# Patient Record
Sex: FEMALE | Race: WHITE | NOT HISPANIC OR LATINO | Employment: FULL TIME | URBAN - METROPOLITAN AREA
[De-identification: names, ages, dates, MRNs, and addresses within clinical notes are randomized per-mention and may not be internally consistent; named-entity substitution may affect disease eponyms.]

---

## 2017-01-25 ENCOUNTER — RECORDS - HEALTHEAST (OUTPATIENT)
Dept: LAB | Facility: CLINIC | Age: 62
End: 2017-01-25

## 2017-01-25 LAB
AST SERPL W P-5'-P-CCNC: 22 U/L (ref 0–40)
CHOLEST SERPL-MCNC: 218 MG/DL
FASTING STATUS PATIENT QL REPORTED: ABNORMAL
HDLC SERPL-MCNC: 45 MG/DL
LDLC SERPL CALC-MCNC: 124 MG/DL
TRIGL SERPL-MCNC: 243 MG/DL

## 2017-11-28 ENCOUNTER — RECORDS - HEALTHEAST (OUTPATIENT)
Dept: LAB | Facility: CLINIC | Age: 62
End: 2017-11-28

## 2017-11-28 LAB
CHOLEST SERPL-MCNC: 225 MG/DL
FASTING STATUS PATIENT QL REPORTED: YES
HDLC SERPL-MCNC: 48 MG/DL
LDLC SERPL CALC-MCNC: 131 MG/DL
TRIGL SERPL-MCNC: 230 MG/DL

## 2018-03-07 ENCOUNTER — RECORDS - HEALTHEAST (OUTPATIENT)
Dept: LAB | Facility: CLINIC | Age: 63
End: 2018-03-07

## 2018-03-07 LAB
ANION GAP SERPL CALCULATED.3IONS-SCNC: 12 MMOL/L (ref 5–18)
BUN SERPL-MCNC: 15 MG/DL (ref 8–22)
CALCIUM SERPL-MCNC: 9.5 MG/DL (ref 8.5–10.5)
CHLORIDE BLD-SCNC: 106 MMOL/L (ref 98–107)
CHOLEST SERPL-MCNC: 254 MG/DL
CO2 SERPL-SCNC: 23 MMOL/L (ref 22–31)
CREAT SERPL-MCNC: 0.78 MG/DL (ref 0.6–1.1)
FASTING STATUS PATIENT QL REPORTED: ABNORMAL
GFR SERPL CREATININE-BSD FRML MDRD: >60 ML/MIN/1.73M2
GLUCOSE BLD-MCNC: 118 MG/DL (ref 70–125)
HDLC SERPL-MCNC: 58 MG/DL
LDLC SERPL CALC-MCNC: 158 MG/DL
POTASSIUM BLD-SCNC: 4.2 MMOL/L (ref 3.5–5)
SODIUM SERPL-SCNC: 141 MMOL/L (ref 136–145)
TRIGL SERPL-MCNC: 191 MG/DL

## 2018-10-10 ENCOUNTER — RECORDS - HEALTHEAST (OUTPATIENT)
Dept: LAB | Facility: CLINIC | Age: 63
End: 2018-10-10

## 2018-10-10 LAB
ANION GAP SERPL CALCULATED.3IONS-SCNC: 11 MMOL/L (ref 5–18)
BUN SERPL-MCNC: 19 MG/DL (ref 8–22)
CALCIUM SERPL-MCNC: 9.6 MG/DL (ref 8.5–10.5)
CHLORIDE BLD-SCNC: 103 MMOL/L (ref 98–107)
CHOLEST SERPL-MCNC: 225 MG/DL
CO2 SERPL-SCNC: 24 MMOL/L (ref 22–31)
CREAT SERPL-MCNC: 0.79 MG/DL (ref 0.6–1.1)
FASTING STATUS PATIENT QL REPORTED: ABNORMAL
GFR SERPL CREATININE-BSD FRML MDRD: >60 ML/MIN/1.73M2
GLUCOSE BLD-MCNC: 85 MG/DL (ref 70–125)
HDLC SERPL-MCNC: 51 MG/DL
LDLC SERPL CALC-MCNC: 109 MG/DL
MAGNESIUM SERPL-MCNC: 1.9 MG/DL (ref 1.8–2.6)
POTASSIUM BLD-SCNC: 4.6 MMOL/L (ref 3.5–5)
SODIUM SERPL-SCNC: 138 MMOL/L (ref 136–145)
TRIGL SERPL-MCNC: 327 MG/DL

## 2019-01-16 ENCOUNTER — RECORDS - HEALTHEAST (OUTPATIENT)
Dept: LAB | Facility: CLINIC | Age: 64
End: 2019-01-16

## 2019-01-16 LAB
ANION GAP SERPL CALCULATED.3IONS-SCNC: 12 MMOL/L (ref 5–18)
BUN SERPL-MCNC: 13 MG/DL (ref 8–22)
CALCIUM SERPL-MCNC: 9.2 MG/DL (ref 8.5–10.5)
CHLORIDE BLD-SCNC: 104 MMOL/L (ref 98–107)
CHOLEST SERPL-MCNC: 214 MG/DL
CO2 SERPL-SCNC: 25 MMOL/L (ref 22–31)
CREAT SERPL-MCNC: 0.76 MG/DL (ref 0.6–1.1)
FASTING STATUS PATIENT QL REPORTED: ABNORMAL
GFR SERPL CREATININE-BSD FRML MDRD: >60 ML/MIN/1.73M2
GLUCOSE BLD-MCNC: 97 MG/DL (ref 70–125)
HDLC SERPL-MCNC: 54 MG/DL
LDLC SERPL CALC-MCNC: 101 MG/DL
POTASSIUM BLD-SCNC: 3.9 MMOL/L (ref 3.5–5)
SODIUM SERPL-SCNC: 141 MMOL/L (ref 136–145)
TRIGL SERPL-MCNC: 297 MG/DL

## 2020-02-18 ENCOUNTER — RECORDS - HEALTHEAST (OUTPATIENT)
Dept: LAB | Facility: CLINIC | Age: 65
End: 2020-02-18

## 2020-02-18 LAB
ANION GAP SERPL CALCULATED.3IONS-SCNC: 10 MMOL/L (ref 5–18)
BUN SERPL-MCNC: 9 MG/DL (ref 8–22)
CALCIUM SERPL-MCNC: 9.1 MG/DL (ref 8.5–10.5)
CHLORIDE BLD-SCNC: 105 MMOL/L (ref 98–107)
CHOLEST SERPL-MCNC: 201 MG/DL
CO2 SERPL-SCNC: 27 MMOL/L (ref 22–31)
CREAT SERPL-MCNC: 0.79 MG/DL (ref 0.6–1.1)
FASTING STATUS PATIENT QL REPORTED: YES
GFR SERPL CREATININE-BSD FRML MDRD: >60 ML/MIN/1.73M2
GLUCOSE BLD-MCNC: 109 MG/DL (ref 70–125)
HDLC SERPL-MCNC: 48 MG/DL
LDLC SERPL CALC-MCNC: 109 MG/DL
POTASSIUM BLD-SCNC: 4.5 MMOL/L (ref 3.5–5)
SODIUM SERPL-SCNC: 142 MMOL/L (ref 136–145)
TRIGL SERPL-MCNC: 222 MG/DL

## 2020-02-26 ASSESSMENT — MIFFLIN-ST. JEOR: SCORE: 1900.79

## 2020-02-27 ASSESSMENT — MIFFLIN-ST. JEOR: SCORE: 1871.76

## 2020-03-03 ENCOUNTER — ANESTHESIA - HEALTHEAST (OUTPATIENT)
Dept: SURGERY | Facility: CLINIC | Age: 65
End: 2020-03-03

## 2020-03-03 ENCOUNTER — SURGERY - HEALTHEAST (OUTPATIENT)
Dept: SURGERY | Facility: CLINIC | Age: 65
End: 2020-03-03

## 2020-03-03 ASSESSMENT — MIFFLIN-ST. JEOR
SCORE: 1832.75
SCORE: 1832.75

## 2020-03-05 ENCOUNTER — HOME CARE/HOSPICE - HEALTHEAST (OUTPATIENT)
Dept: HOME HEALTH SERVICES | Facility: HOME HEALTH | Age: 65
End: 2020-03-05

## 2020-03-06 ENCOUNTER — DOCUMENTATION ONLY (OUTPATIENT)
Dept: OTHER | Facility: CLINIC | Age: 65
End: 2020-03-06

## 2020-03-06 ENCOUNTER — AMBULATORY - HEALTHEAST (OUTPATIENT)
Dept: OTHER | Facility: CLINIC | Age: 65
End: 2020-03-06

## 2020-03-07 ENCOUNTER — HOME CARE/HOSPICE - HEALTHEAST (OUTPATIENT)
Dept: HOME HEALTH SERVICES | Facility: HOME HEALTH | Age: 65
End: 2020-03-07

## 2020-03-08 ENCOUNTER — HOME CARE/HOSPICE - HEALTHEAST (OUTPATIENT)
Dept: HOME HEALTH SERVICES | Facility: HOME HEALTH | Age: 65
End: 2020-03-08

## 2020-03-10 ENCOUNTER — HOME CARE/HOSPICE - HEALTHEAST (OUTPATIENT)
Dept: HOME HEALTH SERVICES | Facility: HOME HEALTH | Age: 65
End: 2020-03-10

## 2020-03-13 ENCOUNTER — HOME CARE/HOSPICE - HEALTHEAST (OUTPATIENT)
Dept: HOME HEALTH SERVICES | Facility: HOME HEALTH | Age: 65
End: 2020-03-13

## 2020-03-16 ENCOUNTER — COMMUNICATION - HEALTHEAST (OUTPATIENT)
Dept: SLEEP MEDICINE | Facility: CLINIC | Age: 65
End: 2020-03-16

## 2020-03-17 ENCOUNTER — HOME CARE/HOSPICE - HEALTHEAST (OUTPATIENT)
Dept: HOME HEALTH SERVICES | Facility: HOME HEALTH | Age: 65
End: 2020-03-17

## 2020-05-19 ENCOUNTER — AMBULATORY - HEALTHEAST (OUTPATIENT)
Dept: VASCULAR SURGERY | Facility: CLINIC | Age: 65
End: 2020-05-19

## 2020-05-19 DIAGNOSIS — M79.604 BILATERAL LEG PAIN: ICD-10-CM

## 2020-05-19 DIAGNOSIS — M79.605 BILATERAL LEG PAIN: ICD-10-CM

## 2020-05-19 DIAGNOSIS — I83.90 VARICOSE VEIN OF LEG: ICD-10-CM

## 2021-04-26 ENCOUNTER — RECORDS - HEALTHEAST (OUTPATIENT)
Dept: LAB | Facility: CLINIC | Age: 66
End: 2021-04-26

## 2021-04-26 LAB
ALBUMIN SERPL-MCNC: 3.6 G/DL (ref 3.5–5)
ALP SERPL-CCNC: 124 U/L (ref 45–120)
ALT SERPL W P-5'-P-CCNC: 23 U/L (ref 0–45)
ANION GAP SERPL CALCULATED.3IONS-SCNC: 13 MMOL/L (ref 5–18)
AST SERPL W P-5'-P-CCNC: 20 U/L (ref 0–40)
BILIRUB SERPL-MCNC: 0.5 MG/DL (ref 0–1)
BUN SERPL-MCNC: 15 MG/DL (ref 8–22)
CALCIUM SERPL-MCNC: 8.7 MG/DL (ref 8.5–10.5)
CHLORIDE BLD-SCNC: 104 MMOL/L (ref 98–107)
CHOLEST SERPL-MCNC: 199 MG/DL
CO2 SERPL-SCNC: 24 MMOL/L (ref 22–31)
CREAT SERPL-MCNC: 0.81 MG/DL (ref 0.6–1.1)
FASTING STATUS PATIENT QL REPORTED: ABNORMAL
GFR SERPL CREATININE-BSD FRML MDRD: >60 ML/MIN/1.73M2
GLUCOSE BLD-MCNC: 151 MG/DL (ref 70–125)
HDLC SERPL-MCNC: 43 MG/DL
LDLC SERPL CALC-MCNC: 115 MG/DL
POTASSIUM BLD-SCNC: 4.3 MMOL/L (ref 3.5–5)
PROT SERPL-MCNC: 6.3 G/DL (ref 6–8)
SODIUM SERPL-SCNC: 141 MMOL/L (ref 136–145)
TRIGL SERPL-MCNC: 206 MG/DL
VIT B12 SERPL-MCNC: 211 PG/ML (ref 213–816)

## 2021-05-26 ENCOUNTER — RECORDS - HEALTHEAST (OUTPATIENT)
Dept: LAB | Facility: CLINIC | Age: 66
End: 2021-05-26

## 2021-05-26 VITALS
HEART RATE: 73 BPM | OXYGEN SATURATION: 97 % | DIASTOLIC BLOOD PRESSURE: 110 MMHG | TEMPERATURE: 98.5 F | SYSTOLIC BLOOD PRESSURE: 168 MMHG

## 2021-05-26 LAB — HBA1C MFR BLD: 6.8 %

## 2021-05-27 VITALS
HEART RATE: 76 BPM | TEMPERATURE: 98.2 F | RESPIRATION RATE: 18 BRPM | OXYGEN SATURATION: 98 % | SYSTOLIC BLOOD PRESSURE: 122 MMHG | DIASTOLIC BLOOD PRESSURE: 76 MMHG

## 2021-05-30 ENCOUNTER — RECORDS - HEALTHEAST (OUTPATIENT)
Dept: ADMINISTRATIVE | Facility: CLINIC | Age: 66
End: 2021-05-30

## 2021-05-31 ENCOUNTER — RECORDS - HEALTHEAST (OUTPATIENT)
Dept: ADMINISTRATIVE | Facility: CLINIC | Age: 66
End: 2021-05-31

## 2021-06-04 VITALS — BODY MASS INDEX: 48.65 KG/M2 | HEIGHT: 64 IN | WEIGHT: 285 LBS

## 2021-06-06 NOTE — ANESTHESIA PROCEDURE NOTES
Spinal Block    Patient location during procedure: OR  Start time: 3/3/2020 2:18 PM  End time: 3/3/2020 2:42 PM  Reason for block: primary anesthetic    Staffing:  Performing  Anesthesiologist: Nish Khoury MD    Preanesthetic Checklist  Completed: patient identified, risks, benefits, and alternatives discussed, timeout performed, consent obtained, airway assessed, oxygen available, suction available, emergency drugs available and hand hygiene performed  Spinal Block  Patient position: sitting  Prep: ChloraPrep  Patient monitoring: heart rate, cardiac monitor, continuous pulse ox and blood pressure  Approach: midline  Location: L3-4  Injection technique: single-shot  Needle type: pencil-tip   Needle gauge: 22 G    Assessment  Events: failed spinal    Additional Notes:  Multiple failed attempts at multiple levels by both Sarbjit Khoury and Kae.   Proceed to GETA.

## 2021-06-06 NOTE — ANESTHESIA PROCEDURE NOTES
Peripheral Block    Patient location during procedure: pre-op  Start time: 3/3/2020 1:54 PM  End time: 3/3/2020 1:59 PM  post-op analgesia per surgeon order as noted in medical record  Staffing:  Performing  Anesthesiologist: Nish Khoury MD  Preanesthetic Checklist  Completed: patient identified, site marked, risks, benefits, and alternatives discussed, timeout performed, consent obtained, airway assessed, oxygen available, suction available, emergency drugs available and hand hygiene performed  Peripheral Block  Block type: saphenous, adductor canal block  Prep: ChloraPrep  Patient position: supine  Patient monitoring: blood pressure, heart rate, continuous pulse oximetry and cardiac monitor  Laterality: right  Injection technique: ultrasound guided    Ultrasound used to visualize needle placement in proximity to nerve being blocked: yes   US used to visualize anesthetic spread  Visualized anatomic structures normal  No Pathological Findings  Permanent ultrasound image captured for medical record  Sterile gel and probe cover used for ultrasound.  Needle  Needle type: Stimuplex   Needle gauge: 20G  Needle length: 4 in  no peripheral nerve catheter placed  Assessment  Injection assessment: negative aspiration for heme, no paresthesia on injection, incremental injection and no difficulty with injection  Additional Notes  15ml 0.5% bupivacaine w/ epi 1:200K injected for nerve block.

## 2021-06-06 NOTE — ANESTHESIA PREPROCEDURE EVALUATION
Anesthesia Evaluation      Patient summary reviewed   No history of anesthetic complications     Airway   Mallampati: II  Neck ROM: full   Pulmonary - negative ROS and normal exam   (+) asthma  moderate,                          Cardiovascular - negative ROS and normal exam  (+) hypertension well controlled, , hypercholesterolemia,      Neuro/Psych - negative ROS   (+) depression, anxiety/panic attacks,     Endo/Other - negative ROS   (+) arthritis, obesity (BMI 49),      GI/Hepatic/Renal - negative ROS   (+) GERD well controlled,             Dental - normal exam                        Anesthesia Plan  Planned anesthetic: spinal and peripheral nerve block  Peripheral nerve block for post-op analgesia as ordered by surgeon.  Saphenous nerve block.  ASA 3     Anesthetic plan and risks discussed with: patient    Post-op plan: routine recovery

## 2021-06-06 NOTE — ANESTHESIA POSTPROCEDURE EVALUATION
Patient: Shelia Yang  Procedure(s):  RIGHT TOTAL KNEE ARTHROPLASTY (Right)  Anesthesia type: spinal    Patient location: PACU  Last vitals:   Vitals Value Taken Time   /65 3/3/2020  5:10 PM   Temp 36.7  C (98.1  F) 3/3/2020  4:20 PM   Pulse 86 3/3/2020  5:20 PM   Resp 24 3/3/2020  5:17 PM   SpO2 94 % 3/3/2020  5:20 PM   Vitals shown include unvalidated device data.  Post vital signs: stable  Level of consciousness: responds to simple questions and responds to stimulation, resting comfortably  Post-anesthesia pain: pain controlled, 3/10  Post-anesthesia nausea and vomiting: no  Pulmonary: unassisted, spontaneous ventilation, face mask  Cardiovascular: stable and blood pressure at baseline  Hydration: adequate  Anesthetic events: no    QCDR Measures:  ASA# 11 - [place]-op Cardiac Arrest: ASA11B - Patient did NOT experience unanticipated cardiac arrest  ASA# 12 - [place]-op Mortality Rate: ASA12B - Patient did NOT die  ASA# 13 - PACU Re-Intubation Rate: ASA13B - Patient did NOT require a new airway mgmt  ASA# 10 - Composite Anes Safety: ASA10A - No serious adverse event    Additional Notes: unable to do SAB, changed to general; pain 3/10 with IV medications, no nausea; supplemental oxygen via mask at 3-4 L; will place ABEBA orders given risk factors for ABEBA

## 2021-06-06 NOTE — ANESTHESIA CARE TRANSFER NOTE
Last vitals:   Vitals:    03/03/20 1602   BP: 165/79   Pulse: 87   Resp: 16   Temp: 37.1  C (98.8  F)   SpO2: 95%     Patient's level of consciousness is drowsy  Spontaneous respirations: yes  Maintains airway independently: yes  Dentition unchanged: yes  Oropharynx: oropharynx clear of all foreign objects    QCDR Measures:  ASA# 20 - Surgical Safety Checklist: WHO surgical safety checklist completed prior to induction    PQRS# 430 - Adult PONV Prevention: 4558F - Pt received => 2 anti-emetic agents (different classes) preop & intraop  ASA# 8 - Peds PONV Prevention: NA - Not pediatric patient, not GA or 2 or more risk factors NOT present  PQRS# 424 - [place]-op Temp Management: 4559F - At least one body temp DOCUMENTED => 35.5C or 95.9F within required timeframe  PQRS# 426 - PACU Transfer Protocol: - Transfer of care checklist used  ASA# 14 - Acute Post-op Pain: ASA14B - Patient did NOT experience pain >= 7 out of 10

## 2021-06-06 NOTE — PROGRESS NOTES
Your patient was discharged from [facility] on 3/17/2020 because pt requests DC from home care. She saw ortho MD for follow-up today and was cleared to begin outpatient PT. She declined to have final home visit for DC so discharge without a visit was completed.  Thank you for allowing us to provide care to this patient!  A Discharge summary is available upon requestâ  [phone].    Thanks,  Gill Conn, PT

## 2021-06-16 PROBLEM — M17.11 ARTHRITIS OF RIGHT KNEE: Status: ACTIVE | Noted: 2020-03-03

## 2021-06-20 NOTE — LETTER
Letter by Glenny Davalos LPN at      Author: Glenny Davalos LPN Service: -- Author Type: --    Filed:  Encounter Date: 3/16/2020 Status: (Other)         Shelia Yang  [facility]  [place] [address]             March 16, 2020         Dear Ms. Yang,     We recently received a referral from Dr. Cedillo for you to be seen at [facility].   We have attempted to contact you a number of times to schedule this appointment but have not heard  back yet.     This is our final attempt to contact you to schedule your appointment. Please call us back at  [phone] to schedule. If you have decided you do not want to be seen for this consultation, please  let us know so we can notify your physician.     We look forward to helping you with your care needs.             Sincerely,     Glenny ANGELES LPN

## 2021-06-26 ENCOUNTER — HEALTH MAINTENANCE LETTER (OUTPATIENT)
Age: 66
End: 2021-06-26

## 2021-10-11 ENCOUNTER — HEALTH MAINTENANCE LETTER (OUTPATIENT)
Age: 66
End: 2021-10-11

## 2022-06-06 ENCOUNTER — LAB REQUISITION (OUTPATIENT)
Dept: LAB | Facility: CLINIC | Age: 67
End: 2022-06-06

## 2022-06-06 DIAGNOSIS — I10 ESSENTIAL (PRIMARY) HYPERTENSION: ICD-10-CM

## 2022-06-06 DIAGNOSIS — E78.00 PURE HYPERCHOLESTEROLEMIA, UNSPECIFIED: ICD-10-CM

## 2022-06-06 PROCEDURE — 80053 COMPREHEN METABOLIC PANEL: CPT | Performed by: FAMILY MEDICINE

## 2022-06-06 PROCEDURE — 80061 LIPID PANEL: CPT | Performed by: FAMILY MEDICINE

## 2022-06-07 LAB
ALBUMIN SERPL-MCNC: 3.6 G/DL (ref 3.5–5)
ALP SERPL-CCNC: 88 U/L (ref 45–120)
ALT SERPL W P-5'-P-CCNC: 15 U/L (ref 0–45)
ANION GAP SERPL CALCULATED.3IONS-SCNC: 12 MMOL/L (ref 5–18)
AST SERPL W P-5'-P-CCNC: 16 U/L (ref 0–40)
BILIRUB SERPL-MCNC: 0.3 MG/DL (ref 0–1)
BUN SERPL-MCNC: 19 MG/DL (ref 8–22)
CALCIUM SERPL-MCNC: 9.2 MG/DL (ref 8.5–10.5)
CHLORIDE BLD-SCNC: 106 MMOL/L (ref 98–107)
CHOLEST SERPL-MCNC: 206 MG/DL
CO2 SERPL-SCNC: 25 MMOL/L (ref 22–31)
CREAT SERPL-MCNC: 0.79 MG/DL (ref 0.6–1.1)
GFR SERPL CREATININE-BSD FRML MDRD: 83 ML/MIN/1.73M2
GLUCOSE BLD-MCNC: 110 MG/DL (ref 70–125)
HDLC SERPL-MCNC: 55 MG/DL
LDLC SERPL CALC-MCNC: 108 MG/DL
POTASSIUM BLD-SCNC: 4 MMOL/L (ref 3.5–5)
PROT SERPL-MCNC: 6.5 G/DL (ref 6–8)
SODIUM SERPL-SCNC: 143 MMOL/L (ref 136–145)
TRIGL SERPL-MCNC: 217 MG/DL

## 2022-07-17 ENCOUNTER — HEALTH MAINTENANCE LETTER (OUTPATIENT)
Age: 67
End: 2022-07-17

## 2022-09-25 ENCOUNTER — HEALTH MAINTENANCE LETTER (OUTPATIENT)
Age: 67
End: 2022-09-25

## 2023-08-05 ENCOUNTER — HEALTH MAINTENANCE LETTER (OUTPATIENT)
Age: 68
End: 2023-08-05

## 2023-10-14 ENCOUNTER — HEALTH MAINTENANCE LETTER (OUTPATIENT)
Age: 68
End: 2023-10-14

## 2024-08-26 ENCOUNTER — HOSPITAL ENCOUNTER (EMERGENCY)
Facility: HOSPITAL | Age: 69
Discharge: HOME | End: 2024-08-27
Attending: EMERGENCY MEDICINE | Admitting: EMERGENCY MEDICINE
Payer: COMMERCIAL

## 2024-08-26 VITALS
HEIGHT: 64 IN | SYSTOLIC BLOOD PRESSURE: 182 MMHG | TEMPERATURE: 97.4 F | HEART RATE: 99 BPM | WEIGHT: 293 LBS | RESPIRATION RATE: 18 BRPM | OXYGEN SATURATION: 94 % | DIASTOLIC BLOOD PRESSURE: 105 MMHG | BODY MASS INDEX: 50.02 KG/M2

## 2024-08-26 DIAGNOSIS — R31.9 URINARY TRACT INFECTION WITH HEMATURIA, SITE UNSPECIFIED: ICD-10-CM

## 2024-08-26 DIAGNOSIS — N39.0 URINARY TRACT INFECTION WITH HEMATURIA, SITE UNSPECIFIED: ICD-10-CM

## 2024-08-26 LAB
ALBUMIN UR-MCNC: 70 MG/DL
ANION GAP SERPL CALCULATED.3IONS-SCNC: 14 MMOL/L (ref 7–15)
APPEARANCE UR: ABNORMAL
BILIRUB UR QL STRIP: NEGATIVE
BUN SERPL-MCNC: 14.9 MG/DL (ref 8–23)
CALCIUM SERPL-MCNC: 9 MG/DL (ref 8.8–10.4)
CHLORIDE SERPL-SCNC: 98 MMOL/L (ref 98–107)
COLOR UR AUTO: YELLOW
CREAT SERPL-MCNC: 0.93 MG/DL (ref 0.51–0.95)
EGFRCR SERPLBLD CKD-EPI 2021: 67 ML/MIN/1.73M2
ERYTHROCYTE [DISTWIDTH] IN BLOOD BY AUTOMATED COUNT: 13 % (ref 10–15)
GLUCOSE SERPL-MCNC: 147 MG/DL (ref 70–99)
GLUCOSE UR STRIP-MCNC: NEGATIVE MG/DL
HCO3 SERPL-SCNC: 23 MMOL/L (ref 22–29)
HCT VFR BLD AUTO: 42.8 % (ref 35–47)
HGB BLD-MCNC: 14 G/DL (ref 11.7–15.7)
HGB UR QL STRIP: ABNORMAL
KETONES UR STRIP-MCNC: NEGATIVE MG/DL
LEUKOCYTE ESTERASE UR QL STRIP: ABNORMAL
MCH RBC QN AUTO: 27.8 PG (ref 26.5–33)
MCHC RBC AUTO-ENTMCNC: 32.7 G/DL (ref 31.5–36.5)
MCV RBC AUTO: 85 FL (ref 78–100)
NITRATE UR QL: NEGATIVE
PH UR STRIP: 5.5 [PH] (ref 5–7)
PLATELET # BLD AUTO: 179 10E3/UL (ref 150–450)
POTASSIUM SERPL-SCNC: 3.9 MMOL/L (ref 3.4–5.3)
RBC # BLD AUTO: 5.03 10E6/UL (ref 3.8–5.2)
RBC URINE: >182 /HPF
SODIUM SERPL-SCNC: 135 MMOL/L (ref 135–145)
SP GR UR STRIP: 1.02 (ref 1–1.03)
SQUAMOUS EPITHELIAL: 2 /HPF
UROBILINOGEN UR STRIP-MCNC: <2 MG/DL
WBC # BLD AUTO: 9.9 10E3/UL (ref 4–11)
WBC CLUMPS #/AREA URNS HPF: PRESENT /HPF
WBC URINE: >182 /HPF

## 2024-08-26 PROCEDURE — 36415 COLL VENOUS BLD VENIPUNCTURE: CPT | Performed by: PHYSICIAN ASSISTANT

## 2024-08-26 PROCEDURE — 87086 URINE CULTURE/COLONY COUNT: CPT | Performed by: EMERGENCY MEDICINE

## 2024-08-26 PROCEDURE — 85027 COMPLETE CBC AUTOMATED: CPT | Performed by: PHYSICIAN ASSISTANT

## 2024-08-26 PROCEDURE — 81003 URINALYSIS AUTO W/O SCOPE: CPT | Performed by: STUDENT IN AN ORGANIZED HEALTH CARE EDUCATION/TRAINING PROGRAM

## 2024-08-26 PROCEDURE — 81001 URINALYSIS AUTO W/SCOPE: CPT | Performed by: EMERGENCY MEDICINE

## 2024-08-26 PROCEDURE — 80048 BASIC METABOLIC PNL TOTAL CA: CPT | Performed by: PHYSICIAN ASSISTANT

## 2024-08-26 PROCEDURE — 99284 EMERGENCY DEPT VISIT MOD MDM: CPT | Mod: 25

## 2024-08-26 ASSESSMENT — COLUMBIA-SUICIDE SEVERITY RATING SCALE - C-SSRS
2. HAVE YOU ACTUALLY HAD ANY THOUGHTS OF KILLING YOURSELF IN THE PAST MONTH?: NO
1. IN THE PAST MONTH, HAVE YOU WISHED YOU WERE DEAD OR WISHED YOU COULD GO TO SLEEP AND NOT WAKE UP?: NO
6. HAVE YOU EVER DONE ANYTHING, STARTED TO DO ANYTHING, OR PREPARED TO DO ANYTHING TO END YOUR LIFE?: NO

## 2024-08-26 ASSESSMENT — ACTIVITIES OF DAILY LIVING (ADL): ADLS_ACUITY_SCORE: 33

## 2024-08-27 ENCOUNTER — APPOINTMENT (OUTPATIENT)
Dept: CT IMAGING | Facility: HOSPITAL | Age: 69
End: 2024-08-27
Attending: PHYSICIAN ASSISTANT
Payer: COMMERCIAL

## 2024-08-27 PROCEDURE — 250N000013 HC RX MED GY IP 250 OP 250 PS 637: Performed by: EMERGENCY MEDICINE

## 2024-08-27 PROCEDURE — 74176 CT ABD & PELVIS W/O CONTRAST: CPT

## 2024-08-27 RX ORDER — ONDANSETRON 4 MG/1
4 TABLET, ORALLY DISINTEGRATING ORAL EVERY 8 HOURS PRN
Qty: 20 TABLET | Refills: 0 | Status: SHIPPED | OUTPATIENT
Start: 2024-08-27

## 2024-08-27 RX ORDER — CEFDINIR 300 MG/1
300 CAPSULE ORAL 2 TIMES DAILY
Qty: 20 CAPSULE | Refills: 0 | Status: SHIPPED | OUTPATIENT
Start: 2024-08-27 | End: 2024-09-06

## 2024-08-27 RX ORDER — CEFDINIR 300 MG/1
300 CAPSULE ORAL ONCE
Status: COMPLETED | OUTPATIENT
Start: 2024-08-27 | End: 2024-08-27

## 2024-08-27 RX ORDER — CEFTRIAXONE 2 G/1
2 INJECTION, POWDER, FOR SOLUTION INTRAMUSCULAR; INTRAVENOUS ONCE
Status: DISCONTINUED | OUTPATIENT
Start: 2024-08-27 | End: 2024-08-27

## 2024-08-27 RX ADMIN — CEFDINIR 300 MG: 300 CAPSULE ORAL at 00:30

## 2024-08-27 ASSESSMENT — ACTIVITIES OF DAILY LIVING (ADL)
ADLS_ACUITY_SCORE: 35
ADLS_ACUITY_SCORE: 35

## 2024-08-27 NOTE — DISCHARGE INSTRUCTIONS
Take the antibiotic (cefdinir) to treat your urine infection.    As needed for pain control at home, take:  - over-the-counter ibuprofen 800mg by mouth every 8 hours (max dose 2400mg in 24 hours)  - over-the-counter acetaminophen 1g by mouth every 6 hours (max dose 4g in 24 hours)    Use the prescribed Zofran for any nausea or vomiting.    Drink plenty of fluids to stay hydrated.    Follow up with your Primary Care provider in 2 days for a recheck.    Return to the Emergency Department for any high fevers, persistent vomiting, severe worsening, or any other concerns.

## 2024-08-27 NOTE — ED PROVIDER NOTES
"Emergency Department Encounter   NAME: Shelia Yang ; AGE: 69 year old female ; YOB: 1955 ; MRN: [id number] ; PCP: Dagmar Long   ED PROVIDER: Lilo Schroeder PA-C    Evaluation Date & Time:   No admission date for patient encounter.    CHIEF COMPLAINT:  Dysuria      Impression and Plan   MDM: Shelia Yang is a 69 year old female who presents to the ED for evaluation of dysuria and hematuria.  The patient presents to the emergency department for evaluation of 1 day of urinary dysuria, frequency, and hematuria.  She states that she passed 2-3 what she describes as \"pearls\" into her urine today which she assumes her blood clots.  She has no personal history of nephrolithiasis though brother was recently diagnosed with such.  She was treated for a UTI on 7/20 with a course of Macrobid at which time symptoms completely resolved.  Here in the ED, she is well-appearing, vitally stable, and in no acute distress.  She has not had any associated abdominal pain, flank pain, renal colic type symptoms, fever or vomiting.  Abdominal exam benign, no CVA tenderness.  Nothing at this time to suggest pyelonephritis or concerns.  Differential includes hematuria, hemorrhagic UTI, nephrolithiasis, infected stone.  Given her description of \"pearls\" in her urine this sounds suggestive of stones. Will obtain CT to further evaluate for obstructing stone. Labs reassuring - no leukocytosis and renal function normal. Her UA today shows greater than 182 RBCs along with 500 leukocytes, greater than 182 WBCs with WBC clumps.  It is nitrate negative today though still suggestive of likely infection.  Urine culture sent and pending.  Patient has not been passing any large clots and is fully emptying her bladder, no indication at this time for CBI. If CT negative, suspect discharge to home on PO antibiotics. If obstructing stone, urology consult likely warranted. Patient's care signed out to my colleague, Dr. Johns, at " end of my shift.     *Patient examination and workup was initiated in triage due to inpatient hospital and Emergency Department bed shortage resulting in long waiting room wait times. Patient and/or guardian's consent was obtained.       Medical Decision Making  Obtained supplemental history:Supplemental history obtained?: Documented in chart and Family Member/Significant Other  Reviewed external records: External records reviewed?: Documented in chart and Outpatient Record[facility]   along with UA from this visit.   Care impacted by chronic illness:Diabetes, Hyperlipidemia, and Hypertension  Care significantly affected by social determinants of health:N/A  Did you consider but not order tests?: Work up considered but not performed and documented in chart, if applicable  Did you interpret images independently?: Independent interpretation of ECG and images noted in documentation, when applicable.  Consultation discussion with other provider:Did you involve another provider (consultant, , pharmacy, etc.)?: No  Admission considered. Patient was signed out to the oncoming physician, disposition pending.    ED COURSE:  10:08 PM I met and introduced myself to the patient. I gathered initial history and performed my physical exam. We discussed plan for initial workup.   12:21 AM Patient's care signed out to Dr. Johns, at the end of my shift pending CT scan and disposition.     At the conclusion of the encounter I discussed the results of all the tests and the disposition. The questions were answered. The patient or family acknowledged understanding and was agreeable with the care plan.    FINAL IMPRESSION:    ICD-10-CM    1. Hematuria  R31.9             MEDICATIONS GIVEN IN THE EMERGENCY DEPARTMENT:  Medications - No data to display      NEW PRESCRIPTIONS STARTED AT TODAY'S ED VISIT:  New Prescriptions    No medications on file         HPI   Use of Intrepreter: N/A  "    Shelia Yang is a 69 year old female with a pertinent history of Hypertension, Type 2 diabetes mellitus, Hyperlipidemia, GERD,  who presents to the ED for evaluation of dysuria. Patient woke up this morning endorsing the \"exact same symptoms\" as her 7/20/2024 UTI. Patient endorses dysuria, hematuria (started as pink and progressively darkened to red), and increased urinary frequency (every 30 minutes compared to baseline of every 2 hours). Patient newly notes three \"clear blood clots\" the size of a \"small sasha\", along with chills since this morning. Patient notes she has been abnormally stressed out recently due to dealing with car financial issues.    Patient states her brother had a kidney stone around 8 years ago.    Patient denies taking any calcium supplements. Patient denies back pain, abdominal pressure, fever, dehydration, or any other symptoms at this time.      Per chart review, [facility]/20/[facility] visit with Shweta Choi APRN,GONZALO, due to a UTI. Patient endorsed pain with urination, urinary frequency, and blood in the urine.  Urinalysis was positive for large blood in the urine, large nitrites and leukocytes. Patient denied fever, chills, shortness of breath. Patient was started on nitrofurantoin monohydrate microcrystal.    REVIEW OF SYSTEMS:  Pertinent positive and negative symptoms per HPI.       Medical History     Past Medical History:   Diagnosis Date    Anxiety     Arthritis     Asthma     Cancer (H)     Depression     GERD (gastroesophageal reflux disease)     History of endometrial cancer     Hyperlipidemia     Hypertension     Obesity, morbid, BMI 50 or higher (H)        Past Surgical History:   Procedure Laterality Date    ANKLE FRACTURE SURGERY Left     ANKLE HARDWARE REMOVAL Left     FRACTURE SURGERY Left     ankle ORIF    HYSTERECTOMY      OTHER SURGICAL HISTORY  2007    laparascopic assisted vaginal hystendometrial cancer    ZZC TOTAL KNEE " "ARTHROPLASTY Right 3/3/2020    Procedure: RIGHT TOTAL KNEE ARTHROPLASTY;  Surgeon: Tino Garza MD;  Location: [facility] OR;  Service: Orthopedics       No family history on file.    Social History     Tobacco Use    Smoking status: Former    Smokeless tobacco: Never    Tobacco comments:     quit at age 44   Substance Use Topics    Alcohol use: Yes     Comment: Alcoholic Drinks/day: occasional    Drug use: Never       acetaminophen (TYLENOL) 500 MG tablet  losartan (COZAAR) 25 MG tablet  omeprazole (PRILOSEC) 20 MG capsule  oxyCODONE (ROXICODONE) 5 MG immediate release tablet  polyethylene glycol (MIRALAX) 17 gram packet  senna-docusate (PERICOLACE) 8.6-50 mg tablet          Physical Exam     First Vitals:  Patient Vitals for the past 24 hrs:   BP Temp Temp src Pulse Resp SpO2 Height Weight   08/26/24 2143 (!) 182/105 97.4  F (36.3  C) Temporal 99 18 94 % 1.626 m (5' 4\") 134.7 kg (296 lb 14.4 oz)         PHYSICAL EXAM:   Physical Exam  Vitals reviewed.   Constitutional:       General: She is not in acute distress.     Appearance: She is not ill-appearing, toxic-appearing or diaphoretic.   Cardiovascular:      Rate and Rhythm: Normal rate and regular rhythm.   Pulmonary:      Effort: Pulmonary effort is normal.      Breath sounds: Normal breath sounds.   Abdominal:      General: Abdomen is flat. Bowel sounds are normal. There is no distension.      Palpations: Abdomen is soft.      Tenderness: There is no abdominal tenderness. There is no right CVA tenderness, left CVA tenderness, guarding or rebound.   Neurological:      Mental Status: She is alert.             Results     LAB:  All pertinent labs reviewed and interpreted  Labs Ordered and Resulted from Time of ED Arrival to Time of ED Departure   ROUTINE UA WITH MICROSCOPIC REFLEX TO CULTURE - Abnormal       Result Value    Color Urine Yellow      Appearance Urine Turbid (*)     Glucose Urine Negative      Bilirubin Urine Negative      Ketones Urine " Negative      Specific Gravity Urine 1.020      Blood Urine >1.0 mg/dL (*)     pH Urine 5.5      Protein Albumin Urine 70 (*)     Urobilinogen Urine <2.0      Nitrite Urine Negative      Leukocyte Esterase Urine 500 Shekhar/uL (*)     WBC Clumps Urine Present (*)     RBC Urine >182 (*)     WBC Urine >182 (*)     Squamous Epithelials Urine 2 (*)    BASIC METABOLIC PANEL - Abnormal    Sodium 135      Potassium 3.9      Chloride 98      Carbon Dioxide (CO2) 23      Anion Gap 14      Urea Nitrogen 14.9      Creatinine 0.93      GFR Estimate 67      Calcium 9.0      Glucose 147 (*)    CBC WITH PLATELETS - Normal    WBC Count 9.9      RBC Count 5.03      Hemoglobin 14.0      Hematocrit 42.8      MCV 85      MCH 27.8      MCHC 32.7      RDW 13.0      Platelet Count 179     URINE CULTURE       RADIOLOGY:  Abd/pelvis CT no contrast - Stone Protocol    (Results Pending)       I, Yves Valverde, am serving as a scribe to document services personally performed by Lilo Schroeder PA-C, based on my observation and the provider's statements to me. I, Lilo Schroeder PA-C attest that Yves Valverde is acting in a scribe capacity, has observed my performance of the services and has documented them in accordance with my direction.       Lilo Schroeder PA-C   Emergency Medicine   [facility] EMERGENCY DEPARTMENT       Lilo Schroeder PA-C  08/27/24 0021

## 2024-08-27 NOTE — ED PROVIDER NOTES
EMERGENCY DEPARTMENT ENCOUNTER      NAME: Shelia Yang  AGE: 69 year old female  YOB: 1955  MRN: [id number]  EVALUATION DATE & TIME: 8/26/2024 10:56 PM    PCP: Gabrielle Shin        Chief Complaint   Patient presents with    Dysuria         IMPRESSION  1. Urinary tract infection with hematuria, site unspecified        PLAN  - cefdinir 300mg q12h x10 days  - close PCP follow up  - discharge to home    ED COURSE & MEDICAL DECISION MAKING    I was consulted by RADHA Casey on this patient. I reviewed ED presentation history, assessment, management, plan to this point. Please see ED JN note for details.      ED Course as of 08/27/24 0303   Tue Aug 27, 2024   0156 CT abdomen/pelvis independently reviewed & interpreted by me: no ureteral stone, no pneumoperitoneum, no SBO, gallstone noted.       Briefly, 69yoF presenting with UTI symptoms; described passing possible kidney stones as well so CT obtained; no ureteral stone noted. Blood tests unremarkable. Feeling well on recheck with reassuring exam. No concern for ongoing sepsis or other emergent life-threatening etiology; ok for outpatient management. Patient comfortable with discharge at this time. Return precautions and need for PCP follow up discussed and understood. No further questions at the time of discharge.          This patient involved a high degree of complexity in medical decision making, as significant risks were present and assessed. Recent encounters & results in medical record reviewed by me.    All workup (i.e. any EKG/labs/imaging as per charting below) reviewed and independently interpreted by me. See respective sections for details.    Broad differential considered for this patient, including but not limited to:  UTI, pyelo, sepsis, ureteral stone, GUERO, acute aortic syndrome, SBO, perforated viscus    I had a face to face encounter with this patient seen by the Advanced Practice Provider (JN). I personally made/approved the  management plan and take responsibility for the patient management. I personally saw patient and performed a substantive portion of the visit including all aspects of the medical decision making.    MEDICATIONS GIVEN IN THE EMERGENCY DEPARTMENT  Medications   cefdinir (OMNICEF) capsule 300 mg (300 mg Oral $Given 8/27/24 0030)       NEW PRESCRIPTIONS STARTED AT TODAY'S ER VISIT  Discharge Medication List as of 8/27/2024  2:33 AM        START taking these medications    Details   cefdinir (OMNICEF) 300 MG capsule Take 1 capsule (300 mg) by mouth 2 times daily for 10 days., Disp-20 capsule, R-0, E-Prescribe      ondansetron (ZOFRAN ODT) 4 MG ODT tab Take 1 tablet (4 mg) by mouth every 8 hours as needed for nausea., Disp-20 tablet, R-0, E-Prescribe           CONTINUE these medications which have NOT CHANGED    Details   acetaminophen (TYLENOL) 500 MG tablet [ACETAMINOPHEN (TYLENOL) 500 MG TABLET] Take 2 tablets (1,000 mg total) by mouth 3 (three) times a day., R-0, OTC      losartan (COZAAR) 25 MG tablet [LOSARTAN (COZAAR) 25 MG TABLET] Take 25 mg by mouth daily., Historical      omeprazole (PRILOSEC) 20 MG capsule [OMEPRAZOLE (PRILOSEC) 20 MG CAPSULE] Take 20 mg by mouth daily before breakfast., Historical      oxyCODONE (ROXICODONE) 5 MG immediate release tablet [OXYCODONE (ROXICODONE) 5 MG IMMEDIATE RELEASE TABLET] Take 0.5-2 tablets (2.5-10 mg total) by mouth every 4 (four) hours as needed for pain., Disp-40 tablet, R-0, NormalTake 0.5-1 tablet for pain 4-6 out of 10, take 1-2 tablets for pain 7 or greater ou t of 10. Alternate with Extra Strength Tylenol. Wait 1 hour after taking 1 pill to take an additional pill.      polyethylene glycol (MIRALAX) 17 gram packet [POLYETHYLENE GLYCOL (MIRALAX) 17 GRAM PACKET] Take 1 packet (17 g total) by mouth daily as needed., R-0, OTCTake while taking narcotics medications and until bowels are back to normal routine. Hold for loose stools      senna-docusate (PERICOLACE) 8.6-50  "mg tablet [SENNA-DOCUSATE (PERICOLACE) 8.6-50 MG TABLET] Take 1 tablet by mouth 2 (two) times a day., R-0, OTCTake while taking narcotics medications and until bowels are back to normal routine. Hold for loose stools                 =================================================================      HPI  Use of Intrepreter: N/A      Shelia CHET Yang is a 69 year old female with a pertinent history of Hypertension, Type 2 diabetes mellitus, Hyperlipidemia, GERD,  who presents to the ED for evaluation of dysuria. Patient woke up this morning endorsing the \"exact same symptoms\" as her 7/20/2024 UTI. Patient endorses dysuria, hematuria (started as pink and progressively darkened to red), and increased urinary frequency (every 30 minutes compared to baseline of every 2 hours). Patient newly notes three \"clear blood clots\" the size of a \"small sasha\", along with chills since this morning. Patient notes she has been abnormally stressed out recently due to dealing with car financial issues.     Patient states her brother had a kidney stone around 8 years ago.     Patient denies taking any calcium supplements. Patient denies back pain, abdominal pressure, fever, dehydration, or any other symptoms at this time.        Per chart review, 7/20/2024 [facility] visit with Shweta Choi APRN, DNP, due to a UTI. Patient endorsed pain with urination, urinary frequency, and blood in the urine.  Urinalysis was positive for large blood in the urine, large nitrites and leukocytes. Patient denied fever, chills, shortness of breath. Patient was started on nitrofurantoin monohydrate microcrystal.          --------------- MEDICAL HISTORY ---------------  PAST MEDICAL HISTORY:  Reviewed by me.  Past Medical History:   Diagnosis Date    Anxiety     Arthritis     osteoarthritis    Asthma     intermittent    Cancer (H)     endometrial cancer    Depression     GERD (gastroesophageal reflux disease)     " History of endometrial cancer     Hyperlipidemia     Hypertension     Obesity, morbid, BMI 50 or higher (H)      Patient Active Problem List   Diagnosis    Arthritis of right knee       PAST SURGICAL HISTORY:  Reviewed by me.  Past Surgical History:   Procedure Laterality Date    ANKLE FRACTURE SURGERY Left     ANKLE HARDWARE REMOVAL Left     FRACTURE SURGERY Left     ankle ORIF    HYSTERECTOMY      OTHER SURGICAL HISTORY  2007    laparascopic assisted vaginal hystendometrial cancer    ZZC TOTAL KNEE ARTHROPLASTY Right 3/3/2020    Procedure: RIGHT TOTAL KNEE ARTHROPLASTY;  Surgeon: Tino Garza MD;  Location: [facility];  Service: Orthopedics       CURRENT MEDICATIONS:    Reviewed by me.  No current facility-administered medications for this encounter.    Current Outpatient Medications:     cefdinir (OMNICEF) 300 MG capsule, Take 1 capsule (300 mg) by mouth 2 times daily for 10 days., Disp: 20 capsule, Rfl: 0    ondansetron (ZOFRAN ODT) 4 MG ODT tab, Take 1 tablet (4 mg) by mouth every 8 hours as needed for nausea., Disp: 20 tablet, Rfl: 0    acetaminophen (TYLENOL) 500 MG tablet, [ACETAMINOPHEN (TYLENOL) 500 MG TABLET] Take 2 tablets (1,000 mg total) by mouth 3 (three) times a day., Disp: , Rfl: 0    losartan (COZAAR) 25 MG tablet, [LOSARTAN (COZAAR) 25 MG TABLET] Take 25 mg by mouth daily., Disp: , Rfl:     omeprazole (PRILOSEC) 20 MG capsule, [OMEPRAZOLE (PRILOSEC) 20 MG CAPSULE] Take 20 mg by mouth daily before breakfast., Disp: , Rfl:     oxyCODONE (ROXICODONE) 5 MG immediate release tablet, [OXYCODONE (ROXICODONE) 5 MG IMMEDIATE RELEASE TABLET] Take 0.5-2 tablets (2.5-10 mg total) by mouth every 4 (four) hours as needed for pain., Disp: 40 tablet, Rfl: 0    polyethylene glycol (MIRALAX) 17 gram packet, [POLYETHYLENE GLYCOL (MIRALAX) 17 GRAM PACKET] Take 1 packet (17 g total) by mouth daily as needed., Disp: , Rfl: 0    senna-docusate (PERICOLACE) 8.6-50 mg tablet, [SENNA-DOCUSATE (PERICOLACE)  "8.6-50 MG TABLET] Take 1 tablet by mouth 2 (two) times a day., Disp: , Rfl: 0    ALLERGIES:  Reviewed by me.  Allergies   Allergen Reactions    Lisinopril Shortness Of Breath and Other (See Comments)     Weight gain & night sweats    Amlodipine Other (See Comments)     Headache & dry mouth, \"patient doesn't remember reaction\"       FAMILY HISTORY:  Reviewed by me.  No family history on file.      SOCIAL HISTORY:   Reviewed by me.  Social History     Socioeconomic History    Marital status:    Tobacco Use    Smoking status: Former    Smokeless tobacco: Never    Tobacco comments:     quit at age 44   Substance and Sexual Activity    Alcohol use: Yes     Comment: Alcoholic Drinks/day: occasional    Drug use: Never         --------------- PHYSICAL EXAM ---------------  Nursing notes and vitals independently reviewed by me.  VITALS:  Vitals:    08/26/24 2143   BP: (!) 182/105   Pulse: 99   Resp: 18   Temp: 97.4  F (36.3  C)   TempSrc: Temporal   SpO2: 94%   Weight: 134.7 kg (296 lb 14.4 oz)   Height: 1.626 m (5' 4\")       PHYSICAL EXAM:    General:  alert, interactive, no distress  Eyes:  conjunctivae clear, conjugate gaze  HENT:  atraumatic, nose with no rhinorrhea, oropharynx clear  Neck:  no meningismus  Cardiovascular:  HR 80s during exam, regular rhythm, no murmurs, brisk cap refill  Chest:  no chest wall tenderness  Pulmonary:  no stridor, normal phonation, normal work of breathing, clear lungs bilaterally  Abdomen: soft, nondistended, nontender  :  no CVA tenderness  Back:  no midline spinal tenderness  Musculoskeletal:  no pretibial edema, no calf tenderness. Gross ROM intact to joints of extremities with no obvious deformities.  Skin:  warm, dry, no rash  Neuro:  awake, alert, answers questions appropriately, follows commands, moves all limbs  Psych:  calm, normal affect      --------------- RESULTS ---------------  LAB:  Reviewed and independently interpreted by me.  Results for orders placed or " performed during the hospital encounter of 08/26/24   Abd/pelvis CT no contrast - Stone Protocol    Impression    IMPRESSION:   1.  No urinary tract calculi or obstruction. Right renal hypodensity has developed, likely a cortical cyst which requires no specific followup. No hydronephrosis or hydroureter on either side. Hysterectomy.    2.  Dependent calcified gallstone without biliary dilatation or adjacent inflammation. Diffusely fatty infiltrated moderately enlarged liver, improved overall size.    3.  Normal cardiac size. Mitral annulus calcifications. Aortoiliac atherosclerotic changes. Ectatic distal abdominal aorta. No aneurysm.    4.  Degenerative spine and joints of the pelvis. Mild left convex lumbar curve.   UA with Microscopic reflex to Culture    Specimen: Urine, Clean Catch   Result Value Ref Range    Color Urine Yellow Colorless, Straw, Light Yellow, Yellow    Appearance Urine Turbid (A) Clear    Glucose Urine Negative Negative mg/dL    Bilirubin Urine Negative Negative    Ketones Urine Negative Negative mg/dL    Specific Gravity Urine 1.020 1.001 - 1.030    Blood Urine >1.0 mg/dL (A) Negative    pH Urine 5.5 5.0 - 7.0    Protein Albumin Urine 70 (A) Negative mg/dL    Urobilinogen Urine <2.0 <2.0 mg/dL    Nitrite Urine Negative Negative    Leukocyte Esterase Urine 500 Shekhar/uL (A) Negative    WBC Clumps Urine Present (A) None Seen /HPF    RBC Urine >182 (H) <=2 /HPF    WBC Urine >182 (H) <=5 /HPF    Squamous Epithelials Urine 2 (H) <=1 /HPF   CBC (+ platelets, no diff)   Result Value Ref Range    WBC Count 9.9 4.0 - 11.0 10e3/uL    RBC Count 5.03 3.80 - 5.20 10e6/uL    Hemoglobin 14.0 11.7 - 15.7 g/dL    Hematocrit 42.8 35.0 - 47.0 %    MCV 85 78 - 100 fL    MCH 27.8 26.5 - 33.0 pg    MCHC 32.7 31.5 - 36.5 g/dL    RDW 13.0 10.0 - 15.0 %    Platelet Count 179 150 - 450 10e3/uL   Basic metabolic panel   Result Value Ref Range    Sodium 135 135 - 145 mmol/L    Potassium 3.9 3.4 - 5.3 mmol/L    Chloride 98  98 - 107 mmol/L    Carbon Dioxide (CO2) 23 22 - 29 mmol/L    Anion Gap 14 7 - 15 mmol/L    Urea Nitrogen 14.9 8.0 - 23.0 mg/dL    Creatinine 0.93 0.51 - 0.95 mg/dL    GFR Estimate 67 >60 mL/min/1.73m2    Calcium 9.0 8.8 - 10.4 mg/dL    Glucose 147 (H) 70 - 99 mg/dL       RADIOLOGY:  Reviewed and independently interpreted by me. Please see official radiology report.  Recent Results (from the past 24 hour(s))   Abd/pelvis CT no contrast - Stone Protocol    Narrative    EXAM: CT ABDOMEN PELVIS W/O CONTRAST  LOCATION: [facility]  DATE: 08/27/2024    INDICATION: Passing stones; hematuria.  COMPARISON: CT abdomen and pelvis without/with IV contrast 02/18/2008.  TECHNIQUE: CT scan of the abdomen and pelvis was performed without IV contrast. Multiplanar reformats were obtained. Dose reduction techniques were used.  CONTRAST: None.    FINDINGS:   LOWER CHEST: Clear. No pleural effusion on either side. Small hiatal hernia. Normal cardiac size. Mitral annulus calcifications. No pericardial effusion. No significant change.    HEPATOBILIARY: Calcified dependent gallstone has developed. No biliary dilatation or adjacent inflammation. Diffusely fatty infiltrated moderately enlarged liver, length of the right lobe measures 19.4 cm (image 58, series 4), previously 21.4 cm. No   discrete lesion.    PANCREAS: Normal.    SPLEEN: Normal.    ADRENAL GLANDS: Normal.    KIDNEYS/BLADDER: Right renal hypodensity has developed in the midportion posterolaterally, likely a cortical cyst which requires no specific followup. Both kidneys are negative for stones or obstruction. Partially distended normal urinary bladder.    BOWEL: Normal appendix. Gas and stool material within normal caliber colon. No mechanical obstruction or free gas. Distal colonic diverticulosis without acute inflammation or secondary complications.    LYMPH NODES: No suspicious abdominopelvic adenopathy.    VASCULATURE: Atherosclerotic and mildly  ectatic distal abdominal aorta measuring 2.2 x 2.4 cm (image 48, series 2). Normal caliber IVC.    PELVIC ORGANS: Hysterectomy. Distal colonic diverticulosis. Normal appendix. Atherosclerotic changes. No adenopathy or free fluid.    MUSCULOSKELETAL: Degenerative spine and joints of the pelvis. Mild left convex lumbar curve.      Impression    IMPRESSION:   1.  No urinary tract calculi or obstruction. Right renal hypodensity has developed, likely a cortical cyst which requires no specific followup. No hydronephrosis or hydroureter on either side. Hysterectomy.    2.  Dependent calcified gallstone without biliary dilatation or adjacent inflammation. Diffusely fatty infiltrated moderately enlarged liver, improved overall size.    3.  Normal cardiac size. Mitral annulus calcifications. Aortoiliac atherosclerotic changes. Ectatic distal abdominal aorta. No aneurysm.    4.  Degenerative spine and joints of the pelvis. Mild left convex lumbar curve.                 --------------- ADDITIONAL MDM ---------------  No MIPS measures identified.    History:  - I considered systemic symptoms of the presenting illness.  - Supplemental history from:       -- patient  - External Record(s) reviewed:       -- Inpatient/outpatient record (clinic visit 7/29/24), prior labs (blood/urine 7/26/24), prior imaging (MRI left knee 2/15/23)       -- see above ED course & MDM for further details    Workup:  - Chart documentation above includes differential considered and any EKGs or imaging independently interpreted by provider.  - emergent/severe conditions considered and evaluated for: see above differential & MDM  - In additional to work up documented, I considered the following work up:       -- CTA chest/abdomen/pelvis       -- see above ED course & MDM for further details    External Consultation:  - Discussion of management with another provider:       -- ED pharmacist re: meds       -- see above charting for additional    Complicating  Factors:  - Care impacted by chronic illness:       -- see above MDM, past medical history, & problem list  - Care affected by social determinants of health:       -- see above social history       -- Access to Affordable Healthcare       -- Access to Medical Care    Disposition Considerations:  - Discharge       -- I considered escalation of care with admission to the hospital, but ultimately discharged the patient per decision making above       -- I recommended the patient continue their current prescription strength medication(s) as charted above in current medications list       -- I prescribed prescription strength medication(s) as charted above       -- I recommended over-the-counter medication(s) as charted above & in discharge instructions           Manfred Johns MD  08/27/24  Emergency Medicine  [facility] EMERGENCY DEPARTMENT  [address] [place] [id number]  [phone]  Dept: [phone]     Manfred Johns MD  08/27/24 0636

## 2024-08-27 NOTE — ED TRIAGE NOTES
On 7/20 diagnosed with UTI. Took nitrofurantoin x 7 days and saw pmd and was cleared from uti.     Pt states today she developed the same symptoms. Urinary frequency all day. Pt noted hematuria and dysuria tonight.     Took a midol just before coming in tonight.

## 2024-08-28 LAB — BACTERIA UR CULT: ABNORMAL

## 2024-08-29 ENCOUNTER — TELEPHONE (OUTPATIENT)
Dept: NURSING | Facility: CLINIC | Age: 69
End: 2024-08-29
Payer: COMMERCIAL

## 2024-08-29 NOTE — TELEPHONE ENCOUNTER
[facility]    Reason for call: Lab Result Notification     Lab Result (including Rx patient on, if applicable).  If culture, copy of lab report at bottom.  Lab Result: Urine Culture - See Below    ED Rx: cefdinir (OMNICEF) 300 MG capsule - Take 1 capsule (300 mg) by mouth 2 times daily for 10 days (SUSCEPTIBLE)    Creatinine Level (mg/dl)   Creatinine   Date Value Ref Range Status   08/26/2024 0.93 0.51 - 0.95 mg/dL Final    Creatinine clearance (ml/min), if applicable    Serum creatinine: 0.93 mg/dL 08/26/24 2327  Estimated creatinine clearance: 80.3 mL/min     ED Symptoms: Presented to the ED with dysuria, hematuria (pink urine that turned dark red) and urinary frequency.      Current Symptoms: Unable to assess.     RN Recommendations/Instructions per [place] ED lab result protocol:   [facility] ED lab result protocol utilized: Urine Culture    Unable to reach patient/caregiver.     Left voicemail message requesting a call back to [phone] between 9 a.m. and 5:30 p.m. for patient's ED/ lab results.      Letter pended to be sent via [email or web address].       LESLY MERAZ RN  
[facility]    Reason for call: Lab Result Notification     Lab Result (including Rx patient on, if applicable).  If culture, copy of lab report at bottom.  Lab Result: Urine Culture  8/27/24 Prescribed Cefdinir (OMNICEF) 300 MG capsule Take 1 capsule (300 mg) by mouth 2 times daily for 10 days. - Oral     Creatinine Level (mg/dl)   Creatinine   Date Value Ref Range Status   08/26/2024 0.93 0.51 - 0.95 mg/dL Final    Creatinine clearance (ml/min), if applicable    Serum creatinine: 0.93 mg/dL 08/26/24 2327  Estimated creatinine clearance: 80.3 mL/min     8/29/24 1:46 pm Pt returning VM left by ED Results Team  Patient's current Symptoms:   Pt states she feels better, did have some chills yesterday, urine is clear.      RN Recommendations/Instructions per [place] ED lab result protocol:   [facility] ED lab result protocol utilized: Urine Culture    Patient/care giver notified to contact your PCP clinic or return to the Emergency department if your:  Symptoms return.  Symptoms do not improve after 3 days on antibiotic.  Symptoms do not resolve after completing antibiotic.  Symptoms worsen or other concerning symptoms.    Rox Catalan RN  
(E4) spontaneous

## 2024-08-29 NOTE — LETTER
August 29, 2024        Shelia Yang  [address]  [facility] [address]          Dear Shelia Yang:    You were seen in the [facility] Emergency Department at [facility] on 8/26/2024.  We are unable to reach you by phone, so we are sending you this letter.     It is important that you call [facility] Emergency Department lab result nurse at [phone], as we have information to relay to you AND/OR we MAY have to make some changes in your treatment.    Best time to call back is between 9AM and 5:30PM, 7 days a week.      Sincerely,     [facility] Emergency Department Lab Result RN  [phone]

## 2024-12-07 ENCOUNTER — HEALTH MAINTENANCE LETTER (OUTPATIENT)
Age: 69
End: 2024-12-07

## 2025-08-09 ENCOUNTER — HEALTH MAINTENANCE LETTER (OUTPATIENT)
Age: 70
End: 2025-08-09